# Patient Record
Sex: MALE | URBAN - METROPOLITAN AREA
[De-identification: names, ages, dates, MRNs, and addresses within clinical notes are randomized per-mention and may not be internally consistent; named-entity substitution may affect disease eponyms.]

---

## 2019-09-30 ENCOUNTER — OFFICE VISIT (OUTPATIENT)
Dept: URBAN - METROPOLITAN AREA CLINIC 29 | Facility: CLINIC | Age: 49
End: 2019-09-30

## 2019-09-30 DIAGNOSIS — Z18.10 RETAINED METAL FRAGMENTS, UNSPECIFIED: Primary | ICD-10-CM

## 2019-09-30 DIAGNOSIS — T15.01XA FOREIGN BODY IN CORNEA, RIGHT EYE, INITIAL ENCOUNTER: ICD-10-CM

## 2019-09-30 PROCEDURE — 65222 REMOVE FOREIGN BODY FROM EYE: CPT | Performed by: OPTOMETRIST

## 2019-09-30 PROCEDURE — 99202 OFFICE O/P NEW SF 15 MIN: CPT | Performed by: OPTOMETRIST

## 2019-09-30 NOTE — IMPRESSION/PLAN
Impression: Retained metal fragments, unspecified: Z18.10. OD. Plan: Discussed diagnosis in detail with patient. Discussed treatment options with patient. Surgical treatment is required. Patient elects to have surgery. Will proceed with surgical treatment today.